# Patient Record
Sex: MALE
[De-identification: names, ages, dates, MRNs, and addresses within clinical notes are randomized per-mention and may not be internally consistent; named-entity substitution may affect disease eponyms.]

---

## 2020-04-25 ENCOUNTER — HOSPITAL ENCOUNTER (EMERGENCY)
Dept: HOSPITAL 5 - ED | Age: 65
Discharge: HOME | End: 2020-04-25
Payer: COMMERCIAL

## 2020-04-25 VITALS — DIASTOLIC BLOOD PRESSURE: 90 MMHG | SYSTOLIC BLOOD PRESSURE: 150 MMHG
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
## 2020-04-25 DIAGNOSIS — S23.9XXA: Primary | ICD-10-CM

## 2020-04-25 DIAGNOSIS — S93.402A: ICD-10-CM
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
## 2020-04-25 DIAGNOSIS — F41.8: ICD-10-CM
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
## 2020-04-25 DIAGNOSIS — F17.200: ICD-10-CM
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
## 2020-04-25 DIAGNOSIS — R41.0: ICD-10-CM

## 2020-04-25 DIAGNOSIS — S13.4XXA: ICD-10-CM

## 2020-04-25 DIAGNOSIS — Z79.899: ICD-10-CM

## 2020-04-25 DIAGNOSIS — Z98.890: ICD-10-CM

## 2020-04-25 DIAGNOSIS — I10: ICD-10-CM

## 2020-04-25 LAB
ALBUMIN SERPL-MCNC: 4.2 G/DL (ref 3.9–5)
ALT SERPL-CCNC: 17 UNITS/L (ref 7–56)
APTT BLD: 28 SEC. (ref 24.2–36.6)
BASOPHILS # (AUTO): 0 K/MM3 (ref 0–0.1)
BASOPHILS NFR BLD AUTO: 0.4 % (ref 0–1.8)
BILIRUB DIRECT SERPL-MCNC: 0.2 MG/DL (ref 0–0.2)
BUN SERPL-MCNC: 15 MG/DL (ref 9–20)
BUN/CREAT SERPL: 15 %
CALCIUM SERPL-MCNC: 9.8 MG/DL (ref 8.4–10.2)
EOSINOPHIL # BLD AUTO: 0 K/MM3 (ref 0–0.4)
EOSINOPHIL NFR BLD AUTO: 0.1 % (ref 0–4.3)
HCT VFR BLD CALC: 42.4 % (ref 35.5–45.6)
HEMOLYSIS INDEX: 3
HGB BLD-MCNC: 14.4 GM/DL (ref 11.8–15.2)
INR PPP: 1.2 (ref 0.87–1.13)
LYMPHOCYTES # BLD AUTO: 1 K/MM3 (ref 1.2–5.4)
LYMPHOCYTES NFR BLD AUTO: 13.3 % (ref 13.4–35)
MCHC RBC AUTO-ENTMCNC: 34 % (ref 32–34)
MCV RBC AUTO: 87 FL (ref 84–94)
MONOCYTES # (AUTO): 0.6 K/MM3 (ref 0–0.8)
MONOCYTES % (AUTO): 8.6 % (ref 0–7.3)
PLATELET # BLD: 169 K/MM3 (ref 140–440)
RBC # BLD AUTO: 4.88 M/MM3 (ref 3.65–5.03)

## 2020-04-25 PROCEDURE — 86901 BLOOD TYPING SEROLOGIC RH(D): CPT

## 2020-04-25 PROCEDURE — 83735 ASSAY OF MAGNESIUM: CPT

## 2020-04-25 PROCEDURE — 82550 ASSAY OF CK (CPK): CPT

## 2020-04-25 PROCEDURE — 73610 X-RAY EXAM OF ANKLE: CPT

## 2020-04-25 PROCEDURE — 85025 COMPLETE CBC W/AUTO DIFF WBC: CPT

## 2020-04-25 PROCEDURE — 99285 EMERGENCY DEPT VISIT HI MDM: CPT

## 2020-04-25 PROCEDURE — 80320 DRUG SCREEN QUANTALCOHOLS: CPT

## 2020-04-25 PROCEDURE — 70450 CT HEAD/BRAIN W/O DYE: CPT

## 2020-04-25 PROCEDURE — 72128 CT CHEST SPINE W/O DYE: CPT

## 2020-04-25 PROCEDURE — 85610 PROTHROMBIN TIME: CPT

## 2020-04-25 PROCEDURE — 84484 ASSAY OF TROPONIN QUANT: CPT

## 2020-04-25 PROCEDURE — 96361 HYDRATE IV INFUSION ADD-ON: CPT

## 2020-04-25 PROCEDURE — 36415 COLL VENOUS BLD VENIPUNCTURE: CPT

## 2020-04-25 PROCEDURE — 72125 CT NECK SPINE W/O DYE: CPT

## 2020-04-25 PROCEDURE — 93005 ELECTROCARDIOGRAM TRACING: CPT

## 2020-04-25 PROCEDURE — 80076 HEPATIC FUNCTION PANEL: CPT

## 2020-04-25 PROCEDURE — 96360 HYDRATION IV INFUSION INIT: CPT

## 2020-04-25 PROCEDURE — 85730 THROMBOPLASTIN TIME PARTIAL: CPT

## 2020-04-25 PROCEDURE — 71045 X-RAY EXAM CHEST 1 VIEW: CPT

## 2020-04-25 PROCEDURE — 86900 BLOOD TYPING SEROLOGIC ABO: CPT

## 2020-04-25 PROCEDURE — 80048 BASIC METABOLIC PNL TOTAL CA: CPT

## 2020-04-25 PROCEDURE — 86850 RBC ANTIBODY SCREEN: CPT

## 2020-04-25 PROCEDURE — G0480 DRUG TEST DEF 1-7 CLASSES: HCPCS
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
